# Patient Record
Sex: FEMALE | Race: AMERICAN INDIAN OR ALASKA NATIVE | NOT HISPANIC OR LATINO | Employment: UNEMPLOYED | URBAN - METROPOLITAN AREA
[De-identification: names, ages, dates, MRNs, and addresses within clinical notes are randomized per-mention and may not be internally consistent; named-entity substitution may affect disease eponyms.]

---

## 2018-01-22 PROBLEM — H11.002 PTERYGIUM OF LEFT EYE: Status: ACTIVE | Noted: 2018-01-22

## 2018-01-24 PROBLEM — H11.002 PTERYGIUM EYE, LEFT: Status: ACTIVE | Noted: 2018-01-24

## 2018-06-26 PROBLEM — H11.9 CONJUNCTIVAL LESION: Status: ACTIVE | Noted: 2018-06-26

## 2019-06-13 ENCOUNTER — PATIENT OUTREACH (OUTPATIENT)
Dept: ADMINISTRATIVE | Facility: HOSPITAL | Age: 59
End: 2019-06-13

## 2019-08-12 ENCOUNTER — PATIENT OUTREACH (OUTPATIENT)
Dept: ADMINISTRATIVE | Facility: HOSPITAL | Age: 59
End: 2019-08-12

## 2019-08-16 ENCOUNTER — LAB VISIT (OUTPATIENT)
Dept: LAB | Facility: HOSPITAL | Age: 59
End: 2019-08-16
Attending: NURSE PRACTITIONER
Payer: MEDICAID

## 2019-08-16 DIAGNOSIS — Z12.12 SCREENING FOR COLORECTAL CANCER: ICD-10-CM

## 2019-08-16 DIAGNOSIS — Z12.11 SCREENING FOR COLORECTAL CANCER: ICD-10-CM

## 2019-08-16 PROCEDURE — 82274 ASSAY TEST FOR BLOOD FECAL: CPT

## 2019-08-20 LAB — HEMOCCULT STL QL IA: NEGATIVE

## 2021-01-27 ENCOUNTER — TELEPHONE (OUTPATIENT)
Dept: ADMINISTRATIVE | Facility: HOSPITAL | Age: 61
End: 2021-01-27

## 2021-01-27 DIAGNOSIS — Z12.31 ENCOUNTER FOR SCREENING MAMMOGRAM FOR BREAST CANCER: Primary | ICD-10-CM

## 2021-01-29 ENCOUNTER — TELEPHONE (OUTPATIENT)
Dept: ADMINISTRATIVE | Facility: HOSPITAL | Age: 61
End: 2021-01-29

## 2021-07-26 ENCOUNTER — PATIENT OUTREACH (OUTPATIENT)
Dept: ADMINISTRATIVE | Facility: HOSPITAL | Age: 61
End: 2021-07-26

## 2021-08-02 ENCOUNTER — LAB VISIT (OUTPATIENT)
Dept: LAB | Facility: HOSPITAL | Age: 61
End: 2021-08-02
Payer: MEDICAID

## 2021-08-02 DIAGNOSIS — Z12.12 SCREENING FOR COLORECTAL CANCER: ICD-10-CM

## 2021-08-02 DIAGNOSIS — Z12.11 SCREENING FOR COLORECTAL CANCER: ICD-10-CM

## 2021-08-02 PROCEDURE — 82274 ASSAY TEST FOR BLOOD FECAL: CPT | Performed by: NURSE PRACTITIONER

## 2021-08-10 LAB — HEMOCCULT STL QL IA: POSITIVE

## 2021-10-01 ENCOUNTER — PATIENT MESSAGE (OUTPATIENT)
Dept: ADMINISTRATIVE | Facility: CLINIC | Age: 61
End: 2021-10-01

## 2021-10-01 ENCOUNTER — HOSPITAL ENCOUNTER (EMERGENCY)
Facility: HOSPITAL | Age: 61
Discharge: HOME | End: 2021-10-01
Attending: STUDENT IN AN ORGANIZED HEALTH CARE EDUCATION/TRAINING PROGRAM
Payer: MEDICAID

## 2021-10-01 VITALS
RESPIRATION RATE: 16 BRPM | SYSTOLIC BLOOD PRESSURE: 107 MMHG | DIASTOLIC BLOOD PRESSURE: 70 MMHG | HEART RATE: 82 BPM | TEMPERATURE: 99 F | OXYGEN SATURATION: 97 %

## 2021-10-01 DIAGNOSIS — U07.1 COVID: Primary | ICD-10-CM

## 2021-10-01 LAB — SARS-COV-2 RDRP RESP QL NAA+PROBE: POSITIVE

## 2021-10-01 PROCEDURE — U0002 COVID-19 LAB TEST NON-CDC: HCPCS | Performed by: STUDENT IN AN ORGANIZED HEALTH CARE EDUCATION/TRAINING PROGRAM

## 2021-10-01 PROCEDURE — 99282 EMERGENCY DEPT VISIT SF MDM: CPT

## 2021-10-02 ENCOUNTER — NURSE TRIAGE (OUTPATIENT)
Dept: ADMINISTRATIVE | Facility: CLINIC | Age: 61
End: 2021-10-02

## 2021-10-04 ENCOUNTER — INFUSION (OUTPATIENT)
Dept: INFECTIOUS DISEASES | Facility: HOSPITAL | Age: 61
End: 2021-10-04
Attending: STUDENT IN AN ORGANIZED HEALTH CARE EDUCATION/TRAINING PROGRAM
Payer: MEDICAID

## 2021-10-04 VITALS
TEMPERATURE: 98 F | HEART RATE: 65 BPM | SYSTOLIC BLOOD PRESSURE: 80 MMHG | RESPIRATION RATE: 16 BRPM | DIASTOLIC BLOOD PRESSURE: 56 MMHG | OXYGEN SATURATION: 95 %

## 2021-10-04 DIAGNOSIS — U07.1 COVID: Primary | ICD-10-CM

## 2021-10-04 PROCEDURE — M0243 CASIRIVI AND IMDEVI INFUSION: HCPCS | Performed by: FAMILY MEDICINE

## 2021-10-04 PROCEDURE — 25000003 PHARM REV CODE 250: Performed by: FAMILY MEDICINE

## 2021-10-04 PROCEDURE — 63600175 PHARM REV CODE 636 W HCPCS: Performed by: FAMILY MEDICINE

## 2021-10-04 RX ORDER — ACETAMINOPHEN 325 MG/1
650 TABLET ORAL ONCE AS NEEDED
Status: DISCONTINUED | OUTPATIENT
Start: 2021-10-04 | End: 2023-07-10 | Stop reason: CLARIF

## 2021-10-04 RX ORDER — EPINEPHRINE 0.3 MG/.3ML
0.3 INJECTION SUBCUTANEOUS
Status: DISCONTINUED | OUTPATIENT
Start: 2021-10-04 | End: 2022-09-30

## 2021-10-04 RX ORDER — SODIUM CHLORIDE 0.9 % (FLUSH) 0.9 %
10 SYRINGE (ML) INJECTION
Status: ACTIVE | OUTPATIENT
Start: 2021-10-04

## 2021-10-04 RX ORDER — DIPHENHYDRAMINE HYDROCHLORIDE 50 MG/ML
25 INJECTION INTRAMUSCULAR; INTRAVENOUS ONCE AS NEEDED
Status: DISCONTINUED | OUTPATIENT
Start: 2021-10-04 | End: 2023-03-01 | Stop reason: HOSPADM

## 2021-10-04 RX ORDER — ALBUTEROL SULFATE 90 UG/1
2 AEROSOL, METERED RESPIRATORY (INHALATION)
Status: DISCONTINUED | OUTPATIENT
Start: 2021-10-04 | End: 2023-07-10 | Stop reason: CLARIF

## 2021-10-04 RX ORDER — ONDANSETRON 4 MG/1
4 TABLET, ORALLY DISINTEGRATING ORAL ONCE AS NEEDED
Status: DISCONTINUED | OUTPATIENT
Start: 2021-10-04 | End: 2023-04-28 | Stop reason: HOSPADM

## 2021-10-04 RX ADMIN — CASIRIVIMAB AND IMDEVIMAB 600 MG: 600; 600 INJECTION, SOLUTION, CONCENTRATE INTRAVENOUS at 08:10

## 2021-10-11 ENCOUNTER — HOSPITAL ENCOUNTER (EMERGENCY)
Facility: HOSPITAL | Age: 61
Discharge: HOME | End: 2021-10-11
Attending: SURGERY
Payer: MEDICAID

## 2021-10-11 VITALS
SYSTOLIC BLOOD PRESSURE: 111 MMHG | TEMPERATURE: 98 F | HEART RATE: 77 BPM | WEIGHT: 134.69 LBS | DIASTOLIC BLOOD PRESSURE: 72 MMHG | BODY MASS INDEX: 25.45 KG/M2 | RESPIRATION RATE: 18 BRPM | OXYGEN SATURATION: 95 %

## 2021-10-11 DIAGNOSIS — U07.1 COVID-19 VIRUS INFECTION: Primary | ICD-10-CM

## 2021-10-11 PROCEDURE — 99284 EMERGENCY DEPT VISIT MOD MDM: CPT | Mod: 25

## 2021-10-11 RX ORDER — METHYLPREDNISOLONE 4 MG/1
TABLET ORAL
Qty: 1 PACKAGE | Refills: 0 | Status: SHIPPED | OUTPATIENT
Start: 2021-10-11 | End: 2022-02-14

## 2021-10-11 RX ORDER — PROMETHAZINE HYDROCHLORIDE AND DEXTROMETHORPHAN HYDROBROMIDE 6.25; 15 MG/5ML; MG/5ML
5 SYRUP ORAL EVERY 6 HOURS PRN
Qty: 118 ML | Refills: 0 | Status: SHIPPED | OUTPATIENT
Start: 2021-10-11 | End: 2021-10-21

## 2021-10-11 RX ORDER — BENZONATATE 100 MG/1
200 CAPSULE ORAL 3 TIMES DAILY PRN
Qty: 20 CAPSULE | Refills: 0 | Status: SHIPPED | OUTPATIENT
Start: 2021-10-11 | End: 2021-10-21

## 2021-10-11 RX ORDER — CETIRIZINE HYDROCHLORIDE 10 MG/1
10 TABLET ORAL DAILY
Qty: 30 TABLET | Refills: 0 | Status: SHIPPED | OUTPATIENT
Start: 2021-10-11 | End: 2022-02-14

## 2022-03-16 DIAGNOSIS — Z12.31 OTHER SCREENING MAMMOGRAM: ICD-10-CM

## 2022-04-04 ENCOUNTER — PATIENT MESSAGE (OUTPATIENT)
Dept: ADMINISTRATIVE | Facility: HOSPITAL | Age: 62
End: 2022-04-04
Payer: MEDICAID

## 2022-07-11 ENCOUNTER — PATIENT MESSAGE (OUTPATIENT)
Dept: ADMINISTRATIVE | Facility: HOSPITAL | Age: 62
End: 2022-07-11
Payer: MEDICAID

## 2022-10-03 ENCOUNTER — PATIENT MESSAGE (OUTPATIENT)
Dept: ADMINISTRATIVE | Facility: HOSPITAL | Age: 62
End: 2022-10-03
Payer: MEDICAID

## 2022-10-11 PROBLEM — C20 RECTAL ADENOCARCINOMA: Status: ACTIVE | Noted: 2022-10-11

## 2023-01-09 ENCOUNTER — PATIENT MESSAGE (OUTPATIENT)
Dept: ADMINISTRATIVE | Facility: HOSPITAL | Age: 63
End: 2023-01-09
Payer: MEDICAID

## 2023-02-10 PROBLEM — K91.89 POSTOPERATIVE ILEUS: Status: ACTIVE | Noted: 2023-02-10

## 2023-02-10 PROBLEM — K56.7 POSTOPERATIVE ILEUS: Status: ACTIVE | Noted: 2023-02-10

## 2023-02-13 PROBLEM — K91.89 POSTOPERATIVE ILEUS: Status: RESOLVED | Noted: 2023-02-10 | Resolved: 2023-02-13

## 2023-02-13 PROBLEM — K56.7 POSTOPERATIVE ILEUS: Status: RESOLVED | Noted: 2023-02-10 | Resolved: 2023-02-13

## 2023-02-15 ENCOUNTER — PATIENT OUTREACH (OUTPATIENT)
Dept: ADMINISTRATIVE | Facility: CLINIC | Age: 63
End: 2023-02-15
Payer: MEDICAID

## 2023-02-18 PROBLEM — K56.7 ILEUS: Status: ACTIVE | Noted: 2023-02-18

## 2023-02-25 PROBLEM — K56.609 SBO (SMALL BOWEL OBSTRUCTION): Status: ACTIVE | Noted: 2023-02-25

## 2023-02-25 PROBLEM — I81 PORTAL VEIN THROMBOSIS: Status: ACTIVE | Noted: 2023-02-25

## 2023-03-02 ENCOUNTER — PATIENT OUTREACH (OUTPATIENT)
Dept: ADMINISTRATIVE | Facility: CLINIC | Age: 63
End: 2023-03-02
Payer: MEDICAID

## 2023-03-14 PROBLEM — R11.2 CINV (CHEMOTHERAPY-INDUCED NAUSEA AND VOMITING): Status: ACTIVE | Noted: 2023-03-14

## 2023-03-14 PROBLEM — T45.1X5A CINV (CHEMOTHERAPY-INDUCED NAUSEA AND VOMITING): Status: ACTIVE | Noted: 2023-03-14

## 2023-03-15 ENCOUNTER — SPECIALTY PHARMACY (OUTPATIENT)
Dept: PHARMACY | Facility: CLINIC | Age: 63
End: 2023-03-15
Payer: MEDICAID

## 2023-03-15 NOTE — TELEPHONE ENCOUNTER
New RX received for capecitabine. Dose appropriate for colorectal cancer, but based on pts kidney function, dose should be decreased. Messaged [facility] to confirm dose. Estimated copay is $0 on Medicaid. Forwarding to initial consult.

## 2023-03-16 ENCOUNTER — SPECIALTY PHARMACY (OUTPATIENT)
Dept: PHARMACY | Facility: CLINIC | Age: 63
End: 2023-03-16
Payer: MEDICAID

## 2023-03-16 DIAGNOSIS — C20 RECTAL ADENOCARCINOMA: Primary | ICD-10-CM

## 2023-03-16 NOTE — TELEPHONE ENCOUNTER
Specialty Pharmacy - Initial Clinical Assessment    Specialty Medication Orders Linked to Encounter      Flowsheet Row Most Recent Value   Medication #1 capecitabine (XELODA) 150 MG tablet (Order#[id number], Rx#[id number])   Medication #2 capecitabine (XELODA) 500 MG Tab (Order#[id number], Rx#[id number])          Patient Diagnosis   C20 - Rectal adenocarcinoma    Subjective    Mirian Lopez is a 63 y.o. female, who is followed by the specialty pharmacy service for management and education.    Recent Encounters       Date Type Provider Description    03/16/2023 Specialty Pharmacy Addie Escalera, [place] Initial Clinical Assessment    03/15/2023 Specialty Pharmacy Jess Grant, [place] Referral Authorization          Clinical call attempts since last clinical assessment   No call attempts found.     Current Outpatient Medications   Medication Sig    apixaban (ELIQUIS) 5 mg Tab Take 1 tablet (5 mg total) by mouth 2 (two) times daily.    carboxymethylcellulose sodium 0.25 % Drop Place 1 drop into both eyes as needed.    lovastatin (MEVACOR) 20 MG tablet TAKE 1 TABLET BY MOUTH ONCE DAILY IN THE EVENING    multivitamin (THERAGRAN) per tablet Take 1 tablet by mouth once daily.    capecitabine (XELODA) 150 MG tablet Take one tablet (150 mg) by mouth twice a day on days 1-14 of a 21-day mimanuel    capecitabine (XELODA) 500 MG Tab Take 2 tablets by mouth (1,000 mg) by mouth twice a day on days 1-14 of a 21-day cycle.    dexAMETHasone (DECADRON) 4 MG Tab Take 2 tablets (8 mg total) by mouth once daily. Take as directed on days 2 and 3 of your chemotherapy cycle.    docusate sodium (COLACE) 100 MG capsule Take 1 capsule (100 mg total) by mouth 1 (one) time if needed for Constipation (Take as needed when ostomy output under 500 mL).    erythromycin base 250 MG Tab Take 250 mg by mouth every 6 (six) hours.    LIDOcaine-prilocaine (EMLA) cream Apply to affected area once (Patient not taking: Reported on 3/13/2023)     lisinopriL 10 MG tablet Take 1 tablet by mouth once daily (Patient not taking: Reported on 3/13/2023)    ondansetron (ZOFRAN-ODT) 8 MG TbDL Take 1 tablet (8 mg total) by mouth every 8 (eight) hours as needed (nausea/vomiting).    oxyCODONE (ROXICODONE) 5 MG immediate release tablet Take 1 tablet (5 mg total) by mouth every 4 (four) hours as needed for Pain. (Patient not taking: Reported on 3/13/2023)    polyethylene glycol (GLYCOLAX) 17 gram PwPk Take 17 g by mouth 2 (two) times daily.    polyvinyl alcohol, artificial tears, (LIQUIFILM TEARS) 1.4 % ophthalmic solution 1 drop as needed.    urea (CARMOL) 40 % Crea Apply topically 2 (two) times daily. To both hands and feet (Patient not taking: Reported on 3/13/2023)   Last reviewed on 3/16/2023  4:05 PM by Addie Escalera, PharmD    Review of patient's allergies indicates:  No Known AllergiesLast reviewed on  3/16/2023 4:03 PM by Addie Escalera          Assessment Questions - Documented Responses      Flowsheet Row Most Recent Value   Assessment    Medication Reconciliation completed for patient Yes   During the past 4 weeks, has patient missed any activities due to condition or medication? No   During the past 4 weeks, did patient have any of the following urgent care visits? None   Goals of Therapy Status Discussed (new start)   Status of the patients ability to self-administer: Is Able   All education points have been covered with patient? Yes, supplemental printed education provided   Welcome packet contents reviewed and discussed with patient? Yes   Assesment completed? Yes   Plan Therapy being initiated   Do you need to open a clinical intervention (i-vent)? No   Do you want to schedule first shipment? Yes   Medication #1 Assessment Info    Patient status New medication, New to [facility]   Is this medication appropriate for the patient? Yes   Is this medication effective? Not yet started   Medication #2 Assessment Info    Patient status New medication, New to [facility]  "  Is this medication appropriate for the patient? Yes   Is this medication effective? Not yet started          Refill Questions - Documented Responses      Flowsheet Row Most Recent Value   Patient Availability and HIPAA Verification    Does patient want to proceed with activity? Yes   HIPAA/medical authority confirmed? Yes   Relationship to patient of person spoken to? Self   Refill Screening Questions    When does the patient need to receive the medication? 03/23/23   Refill Delivery Questions    How will the patient receive the medication? Mail   When does the patient need to receive the medication? 03/23/23   Shipping Address Home   Address in [facility] confirmed and updated if neccessary? Yes   Expected Copay ($) 0   Is the patient able to afford the medication copay? Yes   Payment Method zero copay   Days supply of Refill 28   Supplies needed? No supplies needed   Refill activity completed? Yes   Refill activity plan Refill scheduled   Shipment/Pickup Date: 03/21/23            Objective    She has a past medical history of Conjunctival lesion, Hyperlipidemia, Hypertension, Ileus, postoperative, and Rectal adenocarcinoma.    Tried/failed medications: Concurrent chemoradiation: 5FU    BP Readings from Last 4 Encounters:   03/13/23 106/79   03/13/23 106/79   03/01/23 99/70   02/24/23 95/77     Ht Readings from Last 4 Encounters:   03/13/23 5' 1" (1.549 m)   03/13/23 5' 1" (1.549 m)   02/25/23 5' 1" (1.549 m)   02/24/23 5' 1" (1.549 m)     Wt Readings from Last 4 Encounters:   03/13/23 58 kg (127 lb 13.9 oz)   03/13/23 58 kg (127 lb 15.6 oz)   02/25/23 64.3 kg (141 lb 12.1 oz)   02/24/23 60.8 kg (134 lb 0.6 oz)     Recent Labs   Lab Result Units 03/01/23  0518 02/28/23  0412 02/27/23  0411 02/26/23  0709   RBC M/uL 3.48 L 3.25 L 3.18 L  3.18 L 3.14 L  3.14 L   Hemoglobin g/dL 10.9 L 10.3 L 10.2 L  10.2 L 9.8 L  9.8 L   Hematocrit % 33.7 L 31.3 L 32.1 L  32.1 L 30.9 L  30.9 L   WBC K/uL 5.09 5.31 5.48 "  5.48 4.96  4.96   Gran # (ANC) K/uL 3.8 3.8 4.2  4.2 4.1  4.1   Gran % % 75.4 H 71.9 75.7 H  75.7 H 83.4 H  83.4 H   Platelets K/uL 338 318 284  284 283  283   Sodium mmol/L 138 137 139 139   Potassium mmol/L 3.8 4.2 4.0 4.0   Chloride mmol/L 99 102 104 106   Glucose mg/dL 96 96 108 106   BUN mg/dL 11 7 L 8 9   Creatinine mg/dL 1.0 0.9 0.8 0.8   Calcium mg/dL 9.8 9.3 9.1 8.7   Total Protein g/dL 6.9 6.5 6.1 5.8 L   Albumin g/dL 3.5 3.3 L 3.1 L 2.9 L   Total Bilirubin mg/dL 0.4 0.4 0.4 0.4   Alkaline Phosphatase U/L 81 74 70 68   AST U/L 32 29 18 19   ALT U/L 26 21 16 15     The goals of cancer treatment include:  Achieving remission of cancer, if possible  Reducing tumor size and spread of cancer, if remission is not possible  Minimizing pain and symptoms of the cancer  Preventing infection and other complications of treatment  Promoting adequate nutrition  Encouraging proper hydration  Improving or maintaining quality of life  Maintaining optimal therapy adherence  Minimizing and managing side effects    Goals of Therapy Status: Discussed (new start)    Assessment/Plan  Patient plans to start therapy on 03/23/23      Indication, dosage, appropriateness, effectiveness, safety and convenience of her specialty medication(s) were reviewed today.     Patient Education   Patient received education on the following:   Expectations and possible outcomes of therapy  Proper use, timely administration, and missed dose management  Duration of therapy  Side effects, including prevention, minimization, and management  Contraindications and safety precautions  New or changed medications, including prescribe and over the counter medications and supplements  Reviews recommended vaccinations, as appropriate  Storage, safe handling, and disposal      Tasks added this encounter   4/13/2023 - Refill Call (Auto Added)  9/5/2023 - Clinical - Follow Up Assesement (180 day)   Tasks due within next 3 months   No tasks due.      Addie Escalera, PharmD  [facility]  [address]  [facility] [address]  Phone: [phone]  Fax: [phone]

## 2023-03-27 PROBLEM — E87.6 HYPOKALEMIA: Status: ACTIVE | Noted: 2023-03-27

## 2023-03-27 PROBLEM — R68.89 RIGORS: Status: ACTIVE | Noted: 2023-03-27

## 2023-03-28 PROBLEM — R06.02 SOB (SHORTNESS OF BREATH): Status: ACTIVE | Noted: 2023-03-28

## 2023-03-28 PROBLEM — R06.02 SOB (SHORTNESS OF BREATH): Status: RESOLVED | Noted: 2023-03-28 | Resolved: 2023-03-28

## 2023-03-28 PROBLEM — E87.6 HYPOKALEMIA: Status: RESOLVED | Noted: 2023-03-27 | Resolved: 2023-03-28

## 2023-04-03 ENCOUNTER — PATIENT MESSAGE (OUTPATIENT)
Dept: ADMINISTRATIVE | Facility: HOSPITAL | Age: 63
End: 2023-04-03
Payer: MEDICAID

## 2023-04-14 ENCOUNTER — PATIENT MESSAGE (OUTPATIENT)
Dept: PHARMACY | Facility: CLINIC | Age: 63
End: 2023-04-14
Payer: MEDICAID

## 2023-04-17 ENCOUNTER — PATIENT MESSAGE (OUTPATIENT)
Dept: PHARMACY | Facility: CLINIC | Age: 63
End: 2023-04-17
Payer: MEDICAID

## 2023-04-17 PROBLEM — N17.9 AKI (ACUTE KIDNEY INJURY): Status: ACTIVE | Noted: 2023-04-17

## 2023-04-17 PROBLEM — E87.1 HYPONATREMIA: Status: ACTIVE | Noted: 2023-04-17

## 2023-04-20 ENCOUNTER — SPECIALTY PHARMACY (OUTPATIENT)
Dept: PHARMACY | Facility: CLINIC | Age: 63
End: 2023-04-20
Payer: MEDICAID

## 2023-04-20 NOTE — TELEPHONE ENCOUNTER
Outgoing call to patient for xeloda refill -- patient reports just getting out of the hospital due to side effects from the medication.     Ever since starting xeloda, she has has been very dehydrated and experiencing low blood pressures. Patient expressed that she does not want to continue with the chemotherapy. Advised her to talk with provider at appointment on 4/24 regarding her concerns and then they can decide an action plan. She agreed for [facility] to follow up on Monday after her appointment. Informed rph.

## 2023-04-22 PROBLEM — R62.51 FAILURE TO THRIVE (CHILD): Status: ACTIVE | Noted: 2023-04-22

## 2023-04-22 PROBLEM — E86.1 INTRAVASCULAR VOLUME DEPLETION: Status: ACTIVE | Noted: 2023-04-22

## 2023-04-22 PROBLEM — R62.7 FAILURE TO THRIVE IN ADULT: Status: ACTIVE | Noted: 2023-04-22

## 2023-04-22 PROBLEM — E87.20 METABOLIC ACIDOSIS: Status: ACTIVE | Noted: 2023-04-22

## 2023-04-24 PROBLEM — E43 SEVERE MALNUTRITION: Status: ACTIVE | Noted: 2023-04-24

## 2023-04-24 NOTE — TELEPHONE ENCOUNTER
patient was holding Xeldoa due to side effects. she is now admitted due to PATRICIA. will continue to follow pts hospital admit.

## 2023-04-25 PROBLEM — E87.20 METABOLIC ACIDOSIS: Status: RESOLVED | Noted: 2023-04-22 | Resolved: 2023-04-25

## 2023-04-25 PROBLEM — R19.7 DIARRHEA: Status: ACTIVE | Noted: 2023-04-25

## 2023-04-27 PROBLEM — D64.9 NORMOCYTIC ANEMIA: Status: ACTIVE | Noted: 2023-04-27

## 2023-04-27 PROBLEM — Z43.2 ILEOSTOMY CARE: Status: ACTIVE | Noted: 2023-04-27

## 2023-05-02 ENCOUNTER — PATIENT MESSAGE (OUTPATIENT)
Dept: PHARMACY | Facility: CLINIC | Age: 63
End: 2023-05-02
Payer: MEDICAID

## 2023-05-02 NOTE — TELEPHONE ENCOUNTER
Pt has been dc'ed. Xeloda rx dc'ed upon discharge. no f/u scheduled with heme/onc. outgoing call to pt to check status. no answer/LVM. will continue to follow up.

## 2023-05-03 ENCOUNTER — PATIENT OUTREACH (OUTPATIENT)
Dept: ADMINISTRATIVE | Facility: HOSPITAL | Age: 63
End: 2023-05-03
Payer: MEDICAID

## 2023-06-09 ENCOUNTER — PATIENT OUTREACH (OUTPATIENT)
Dept: ADMINISTRATIVE | Facility: HOSPITAL | Age: 63
End: 2023-06-09
Payer: MEDICAID

## 2023-07-31 PROBLEM — N17.9 AKI (ACUTE KIDNEY INJURY): Status: RESOLVED | Noted: 2023-04-17 | Resolved: 2023-07-31

## 2023-11-07 PROBLEM — N17.9 AKI (ACUTE KIDNEY INJURY): Status: ACTIVE | Noted: 2023-11-07

## 2023-11-30 PROBLEM — Z93.2 ILEOSTOMY IN PLACE: Status: ACTIVE | Noted: 2023-11-30

## 2023-12-02 PROBLEM — Z93.2 ILEOSTOMY IN PLACE: Status: RESOLVED | Noted: 2023-11-30 | Resolved: 2023-12-02

## 2024-07-31 DIAGNOSIS — Z12.31 OTHER SCREENING MAMMOGRAM: ICD-10-CM

## 2024-11-26 ENCOUNTER — PATIENT MESSAGE (OUTPATIENT)
Dept: ADMINISTRATIVE | Facility: HOSPITAL | Age: 64
End: 2024-11-26

## 2024-12-23 ENCOUNTER — PATIENT MESSAGE (OUTPATIENT)
Dept: ADMINISTRATIVE | Facility: HOSPITAL | Age: 64
End: 2024-12-23

## 2025-02-19 DIAGNOSIS — Z78.0 MENOPAUSE: ICD-10-CM

## 2025-04-30 DIAGNOSIS — N18.31 STAGE 3A CHRONIC KIDNEY DISEASE: ICD-10-CM

## 2025-07-16 DIAGNOSIS — Z78.0 MENOPAUSE: ICD-10-CM
